# Patient Record
(demographics unavailable — no encounter records)

---

## 2025-03-17 NOTE — ADDENDUM
[FreeTextEntry1] : Blood will be drawn in office today.  This note was written by Neetu Kolb on 03/17/2025 acting as medical scribe for Dr. Reagan Gutierrez. I, Dr. Reagan Gutierrez, have read and attest that all the information, medical decision making and discharge instructions within are true and accurate.

## 2025-03-17 NOTE — HISTORY OF PRESENT ILLNESS
[FreeTextEntry1] : Mr. NATH is a 59-year-old male who returns with regard to a history of thyroid nodule. He did have thyroid US from March  showing a rt and a left subcm nodule -both non high risk F/u Thyroid US from March 2023 again showed a right along with a left sub cm no high risk nodule.  Thyroid US from 03/18/24 showed MNG with bilateral stable non high risk sub cm nodularity.  Denies anterior neck discomfort, difficulty swallowing, or any change in the appearance of the neck region.  Additional medical history includes that of neurofibromatosis -hx of having had thoracic and lumbar spine tumors removed and lesions on face and cafe au alit spot. Too with hx of hyperlipidemia and Palpitations along with vitamin d deficiency along with low grade tumor of ampulla  Had left shoulder surgery July 2023 for release of frozen shoulder.  Is seeing GI Dr. stewart hx of pancr duct dilated, had EUS at 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 He is taking Alendronate per his rheum Dr. Monsalve. ____________________________________________________________________________________________________ Additional Medications: Atorvastatin 40 mg, Metoprolol, Tamsulosin, multivit, complex - No vitamin D Heart rate up of late- followed with cardiology and Metoprolol increased to 75 mg from 50 mg.

## 2025-03-17 NOTE — HISTORY OF PRESENT ILLNESS
[FreeTextEntry1] : Mr. NATH is a 59-year-old male who returns with regard to a history of thyroid nodule. He did have thyroid US from March  showing a rt and a left subcm nodule -both non high risk F/u Thyroid US from March 2023 again showed a right along with a left sub cm no high risk nodule.  Thyroid US from 03/18/24 showed MNG with bilateral stable non high risk sub cm nodularity.  Denies anterior neck discomfort, difficulty swallowing, or any change in the appearance of the neck region.  Additional medical history includes that of neurofibromatosis -hx of having had thoracic and lumbar spine tumors removed and lesions on face and cafe au alit spot. Too with hx of hyperlipidemia and Palpitations along with vitamin d deficiency along with low grade tumor of ampulla  Had left shoulder surgery July 2023 for release of frozen shoulder.  Is seeing GI Dr. 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 He is taking Alendronate per his rheum Dr. Monsalve. ____________________________________________________________________________________________________ Additional Medications: Atorvastatin 40 mg, Metoprolol, Tamsulosin, multivit, complex - No vitamin D Heart rate up of late- followed with cardiology and Metoprolol increased to 75 mg from 50 mg.

## 2025-03-17 NOTE — PROCEDURE
[[employer] e 2008 model, 10-12 MHz frequencies] : multiple real time longitudinal and transverse images were obtained using a high resolution ultrasound with a linear transducer, [employer] e 2008 model, 10-12 MHz frequencies. All measurements will be reported as longitudinal x jasmeet-posterior x transverse. [Thyroid Nodule] : thyroid nodule [] : a heterogeneous parenchyma [Left Thyroid] : left [Lower] : lower pole there is a  [Solid] : solid [Isoechoic] : isoechoic nodule [Round] : round in shape [Regular] : regular [No] : does not have a halo [Macrocalcifications] : macrocalcifications [Peripheral vascularity] : peripheral vascularity [FreeTextEntry1] : 3.25 x 2.35 x 2.71 [FreeTextEntry5] : 3.42 x 2.48 x 2.98 [FreeTextEntry2] : 0.82 [FreeTextEntry3] : 0.46 x 0.34 x 0.45

## 2025-03-17 NOTE — PHYSICAL EXAM
[Alert] : alert [Well Nourished] : well nourished [No Acute Distress] : no acute distress [Well Developed] : well developed [Normal Sclera/Conjunctiva] : normal sclera/conjunctiva [EOMI] : extra ocular movement intact [No Proptosis] : no proptosis [Normal Oropharynx] : the oropharynx was normal [Thyroid Not Enlarged] : the thyroid was not enlarged [No Thyroid Nodules] : no palpable thyroid nodules [No Respiratory Distress] : no respiratory distress [No Accessory Muscle Use] : no accessory muscle use [Clear to Auscultation] : lungs were clear to auscultation bilaterally [Normal S1, S2] : normal S1 and S2 [Normal Rate] : heart rate was normal [Regular Rhythm] : with a regular rhythm [No Edema] : no peripheral edema [Pedal Pulses Normal] : the pedal pulses are present [Normal Bowel Sounds] : normal bowel sounds [Not Tender] : non-tender [Not Distended] : not distended [Soft] : abdomen soft [Normal Anterior Cervical Nodes] : no anterior cervical lymphadenopathy [No Spinal Tenderness] : no spinal tenderness [Spine Straight] : spine straight [No Stigmata of Cushings Syndrome] : no stigmata of Cushings Syndrome [Normal Gait] : normal gait [Normal Strength/Tone] : muscle strength and tone were normal [No Rash] : no rash [Normal Reflexes] : deep tendon reflexes were 2+ and symmetric [Oriented x3] : oriented to person, place, and time [No Tremors] : no tremors [Acanthosis Nigricans] : no acanthosis nigricans